# Patient Record
Sex: FEMALE | Race: WHITE | NOT HISPANIC OR LATINO | Employment: UNEMPLOYED | URBAN - METROPOLITAN AREA
[De-identification: names, ages, dates, MRNs, and addresses within clinical notes are randomized per-mention and may not be internally consistent; named-entity substitution may affect disease eponyms.]

---

## 2022-12-22 ENCOUNTER — HOSPITAL ENCOUNTER (EMERGENCY)
Facility: HOSPITAL | Age: 37
Discharge: HOME | End: 2022-12-22

## 2022-12-22 VITALS
DIASTOLIC BLOOD PRESSURE: 81 MMHG | OXYGEN SATURATION: 98 % | SYSTOLIC BLOOD PRESSURE: 145 MMHG | BODY MASS INDEX: 30.31 KG/M2 | HEART RATE: 62 BPM | TEMPERATURE: 99 F | WEIGHT: 200 LBS | HEIGHT: 68 IN | RESPIRATION RATE: 18 BRPM

## 2022-12-22 DIAGNOSIS — F41.9 ANXIETY: ICD-10-CM

## 2022-12-22 DIAGNOSIS — R51.9 NONINTRACTABLE HEADACHE, UNSPECIFIED CHRONICITY PATTERN, UNSPECIFIED HEADACHE TYPE: Primary | ICD-10-CM

## 2022-12-22 LAB
INFLUENZA A, MOLECULAR: NEGATIVE
INFLUENZA B, MOLECULAR: NEGATIVE
SARS-COV-2 RDRP RESP QL NAA+PROBE: NEGATIVE
SPECIMEN SOURCE: NORMAL

## 2022-12-22 PROCEDURE — 70450 CT HEAD/BRAIN W/O DYE: CPT | Mod: TC

## 2022-12-22 PROCEDURE — 87389 HIV-1 AG W/HIV-1&-2 AB AG IA: CPT | Performed by: EMERGENCY MEDICINE

## 2022-12-22 PROCEDURE — 86803 HEPATITIS C AB TEST: CPT | Performed by: EMERGENCY MEDICINE

## 2022-12-22 PROCEDURE — 70450 CT HEAD/BRAIN W/O DYE: CPT | Mod: 26,,, | Performed by: RADIOLOGY

## 2022-12-22 PROCEDURE — 99285 EMERGENCY DEPT VISIT HI MDM: CPT | Mod: 25

## 2022-12-22 PROCEDURE — 70450 CT HEAD WITHOUT CONTRAST: ICD-10-PCS | Mod: 26,,, | Performed by: RADIOLOGY

## 2022-12-22 PROCEDURE — 96361 HYDRATE IV INFUSION ADD-ON: CPT

## 2022-12-22 PROCEDURE — 63600175 PHARM REV CODE 636 W HCPCS: Performed by: NURSE PRACTITIONER

## 2022-12-22 PROCEDURE — 96372 THER/PROPH/DIAG INJ SC/IM: CPT | Mod: 59 | Performed by: NURSE PRACTITIONER

## 2022-12-22 PROCEDURE — 87502 INFLUENZA DNA AMP PROBE: CPT | Performed by: NURSE PRACTITIONER

## 2022-12-22 PROCEDURE — U0002 COVID-19 LAB TEST NON-CDC: HCPCS | Performed by: NURSE PRACTITIONER

## 2022-12-22 PROCEDURE — 25000003 PHARM REV CODE 250: Performed by: NURSE PRACTITIONER

## 2022-12-22 PROCEDURE — 96365 THER/PROPH/DIAG IV INF INIT: CPT

## 2022-12-22 RX ORDER — HYDROXYZINE PAMOATE 25 MG/1
25 CAPSULE ORAL 4 TIMES DAILY PRN
Qty: 30 CAPSULE | Refills: 0 | Status: SHIPPED | OUTPATIENT
Start: 2022-12-22

## 2022-12-22 RX ORDER — KETOROLAC TROMETHAMINE 30 MG/ML
30 INJECTION, SOLUTION INTRAMUSCULAR; INTRAVENOUS
Status: COMPLETED | OUTPATIENT
Start: 2022-12-22 | End: 2022-12-22

## 2022-12-22 RX ORDER — HYDROXYZINE PAMOATE 25 MG/1
50 CAPSULE ORAL
Status: COMPLETED | OUTPATIENT
Start: 2022-12-22 | End: 2022-12-22

## 2022-12-22 RX ADMIN — SODIUM CHLORIDE 1000 ML: 0.9 INJECTION, SOLUTION INTRAVENOUS at 04:12

## 2022-12-22 RX ADMIN — PROMETHAZINE HYDROCHLORIDE 25 MG: 25 INJECTION INTRAMUSCULAR; INTRAVENOUS at 04:12

## 2022-12-22 RX ADMIN — HYDROXYZINE PAMOATE 50 MG: 25 CAPSULE ORAL at 03:12

## 2022-12-22 RX ADMIN — KETOROLAC TROMETHAMINE 30 MG: 30 INJECTION, SOLUTION INTRAMUSCULAR; INTRAVENOUS at 03:12

## 2022-12-22 NOTE — ED TRIAGE NOTES
Patient presents to ED with c/o headache and tingling to her head since last night. She denies relief with ibuprofen at home. Pt is AAOx4. Anxious. Skin warm, dry to touch. Respirations even, nonlabored. Ambulatory into triage with steady gait unassisted. She reports that she had one headache similar to this one 15 years ago. Pt is noted to be hypertensive. She has no PCP and does not take medications.

## 2022-12-22 NOTE — ED PROVIDER NOTES
Encounter Date: 12/22/2022       History     Chief Complaint   Patient presents with    Headache     Patient 37-year-old female presents emergency room with posterior headache, muscle tightness in her neck, periodic nausea secondary to worsening headache.  Patient states she is been taking ibuprofen at home for the past 2 days in the headache has not improved.  Patient states she also has a history of anxiety in the past, and has had some anxious feeling over the past day.  Patient has not taken any medications for anxiety currently.  Patient states 15 years ago that she had a CT done and had questionable angiomas on that CT, but has never had any significant follow-up.  Mother in room also confirms CT findings.  Patient does not have any neuro deficits at this time.  Patient states yesterday she did have some hand numbness tingling as well as some numbness around her mouth.  But all that has resolved.  Patient states couple time she did have some blurry vision as during all that time as well as tingling to the back of her head.  Patient states sometimes the headache does move to the top of her head.  She denies any syncopal episodes, chest pain, shortness of breath, vomiting, diarrhea, abdominal pain, dysuria discharge.    Review of patient's allergies indicates:   Allergen Reactions    Penicillins Rash     Past Medical History:   Diagnosis Date    Asthma      Past Surgical History:   Procedure Laterality Date    TUBAL LIGATION       History reviewed. No pertinent family history.  Social History     Tobacco Use    Smoking status: Every Day     Types: Cigarettes    Smokeless tobacco: Never   Substance Use Topics    Alcohol use: Yes    Drug use: Never     Review of Systems   Constitutional: Negative.    HENT: Negative.     Eyes: Negative.    Respiratory: Negative.     Cardiovascular: Negative.    Gastrointestinal: Negative.    Endocrine: Negative.    Genitourinary: Negative.    Musculoskeletal: Negative.    Skin:  Negative.    Allergic/Immunologic: Negative for food allergies.   Neurological:  Positive for numbness and headaches. Negative for tremors, seizures, syncope, speech difficulty and weakness.   Hematological: Negative.    Psychiatric/Behavioral:  The patient is nervous/anxious.    All other systems reviewed and are negative.    Physical Exam     Initial Vitals [12/22/22 1101]   BP Pulse Resp Temp SpO2   (!) 157/108 91 20 98.7 °F (37.1 °C) 96 %      MAP       --         Physical Exam    Nursing note and vitals reviewed.  Constitutional: She appears well-developed and well-nourished. She is not diaphoretic. No distress.   HENT:   Head: Normocephalic and atraumatic.   Mouth/Throat: Oropharynx is clear and moist.   Eyes: Conjunctivae and EOM are normal. Pupils are equal, round, and reactive to light. No scleral icterus.   Neck: No thyromegaly present.   Normal range of motion.  Cardiovascular:  Normal rate, regular rhythm, normal heart sounds and intact distal pulses.     Exam reveals no friction rub.       No murmur heard.  Pulmonary/Chest: Breath sounds normal. No respiratory distress. She has no wheezes. She has no rhonchi. She has no rales.   Abdominal: Abdomen is soft.   Musculoskeletal:         General: No tenderness or edema. Normal range of motion.      Cervical back: Normal range of motion.      Comments: Muscle tenderness noted to the bilateral sides of neck.     Lymphadenopathy:     She has no cervical adenopathy.   Neurological: She is alert and oriented to person, place, and time. She has normal strength. No sensory deficit. GCS score is 15. GCS eye subscore is 4. GCS verbal subscore is 5. GCS motor subscore is 6.   Skin: Skin is warm and dry. Capillary refill takes 2 to 3 seconds. No rash noted.   Psychiatric: She has a normal mood and affect.       ED Course   Procedures  Labs Reviewed   INFLUENZA A & B BY MOLECULAR   SARS-COV-2 RNA AMPLIFICATION, QUAL    Narrative:     Is the patient symptomatic?->No    HIV 1 / 2 ANTIBODY   HEPATITIS C ANTIBODY          Imaging Results              CT Head Without Contrast (Final result)  Result time 12/22/22 16:18:29      Final result by Merrill Zuniga MD (12/22/22 16:18:29)                   Impression:      No evidence of an acute intracranial abnormality.      Electronically signed by: Merrill Zuniga  Date:    12/22/2022  Time:    16:18               Narrative:    EXAMINATION:  CT HEAD WITHOUT CONTRAST    CLINICAL HISTORY:  Headache, chronic, new features or increased frequency;    TECHNIQUE:  Low dose axial images were obtained through the head.  Coronal and sagittal reformations were also performed. Contrast was not administered.    COMPARISON:  None.    FINDINGS:  Ventricles and sulci are normal in size for age without evidence of hydrocephalus.    The brain parenchyma appears within normal limits.  No definite parenchymal mass, hemorrhage, edema or acute major vascular distribution infarct.    No extra-axial blood or fluid collections.    No displaced calvarial fracture.    The mastoid air cells and visualized paranasal sinuses are essentially clear.                                    X-Rays:   Independently Interpreted Readings:   Other Readings:  CT head     CT reviewed by me, agree with Radiology findings.    COMPARISON:  None.     FINDINGS:  Ventricles and sulci are normal in size for age without evidence of hydrocephalus.     The brain parenchyma appears within normal limits.  No definite parenchymal mass, hemorrhage, edema or acute major vascular distribution infarct.     No extra-axial blood or fluid collections.     No displaced calvarial fracture.     The mastoid air cells and visualized paranasal sinuses are essentially clear.     Impression:     No evidence of an acute intracranial abnormality.        Electronically signed by: Merrill Zuniga  Date:                                            12/22/2022  Medications   hydrOXYzine pamoate capsule 50 mg (50 mg Oral Given  12/22/22 1505)   ketorolac injection 30 mg (30 mg Intramuscular Given 12/22/22 1505)   sodium chloride 0.9% bolus 1,000 mL 1,000 mL (1,000 mLs Intravenous New Bag 12/22/22 1637)   promethazine (PHENERGAN) 25 mg in dextrose 5 % 50 mL IVPB (0 mg Intravenous Stopped 12/22/22 1657)     Medical Decision Making:   Initial Assessment:   Patient seen examined emergency room.  Patient is not light sensitive or sound sensitive.  Patient states headache is rule at the moment, still states it is in the back of her head and goes down her neck.  Patient denies any nausea, numbness, tingling, weakness, vision changes.  Detailed assessment as noted above.  Differential Diagnosis:   Headache, migraine, COVID, flu, aneurysm, CVA  Clinical Tests:   Lab Tests: Ordered and Reviewed       <> Summary of Lab: COVID influenza negative.  ED Management:  Patient examined emergency room, will rule out some things like influenza and COVID.  Will treat patient with Toradol IM, Vistaril p.o. to see if this will aid with her headache and possible anxiety as the underlying cause while waiting labs.  Will consider CT of the head if labs and current treatment plan is unsuccessful.    After discussing patient's COVID influenza status, there still adamant about needing a CT of the head just to be sure that symptoms from 15 years ago has not gotten worse.    1821:  After IV fluids and IV Phenergan, patient states she is feeling better.  Headache is easing up.  Patient states she does have history of anxiety, feels like the Vistaril may have helped a little bit.  Will prescribe the patient Vistaril at discharge.  Encouraged patient follow-up with Psychology for further evaluation of her underlying in history of depression and anxiety.  Continue take Tylenol ibuprofen as needed for headaches.  Be sure she sleeps and gets plenty rest.  Follow up primary care provider in 3-5 days if no improvement.                        Clinical Impression:   Final  diagnoses:  [R51.9] Nonintractable headache, unspecified chronicity pattern, unspecified headache type (Primary)  [F41.9] Anxiety        ED Disposition Condition    Discharge Stable          ED Prescriptions       Medication Sig Dispense Start Date End Date Auth. Provider    hydrOXYzine pamoate (VISTARIL) 25 MG Cap Take 1 capsule (25 mg total) by mouth 4 (four) times daily as needed (Anxiety). 30 capsule 12/22/2022 -- Crispin Wooten NP          Follow-up Information       Follow up With Specialties Details Why Contact Info        Follow-up primary care provider in Psychology within 3-5 days.             Crispin Wooten NP  12/22/22 0254

## 2022-12-23 LAB
HCV AB SERPL QL IA: NORMAL
HIV 1+2 AB+HIV1 P24 AG SERPL QL IA: NORMAL

## 2022-12-23 NOTE — DISCHARGE INSTRUCTIONS
Take medications as prescribed.    Continue take Tylenol or ibuprofen for headaches.    Be sure to drink plenty of fluids stay hydrated.    Follow-up primary care provider in 3-5 days if no improvement.    Follow-up with Psychology as soon as you get an appointment for further evaluation of anxiety and depression.    Return emergency room if symptoms continue, worsen or develops any new or other worrisome symptoms.